# Patient Record
Sex: MALE | Race: WHITE | NOT HISPANIC OR LATINO | Employment: OTHER | URBAN - METROPOLITAN AREA
[De-identification: names, ages, dates, MRNs, and addresses within clinical notes are randomized per-mention and may not be internally consistent; named-entity substitution may affect disease eponyms.]

---

## 2018-01-22 ENCOUNTER — OFFICE VISIT CONVERTED (OUTPATIENT)
Dept: UROLOGY | Facility: CLINIC | Age: 63
End: 2018-01-22
Attending: UROLOGY

## 2018-02-19 ENCOUNTER — OFFICE VISIT CONVERTED (OUTPATIENT)
Dept: UROLOGY | Facility: CLINIC | Age: 63
End: 2018-02-19
Attending: UROLOGY

## 2018-03-23 ENCOUNTER — OFFICE VISIT CONVERTED (OUTPATIENT)
Dept: UROLOGY | Facility: CLINIC | Age: 63
End: 2018-03-23
Attending: UROLOGY

## 2018-04-27 ENCOUNTER — OFFICE VISIT CONVERTED (OUTPATIENT)
Dept: UROLOGY | Facility: CLINIC | Age: 63
End: 2018-04-27
Attending: UROLOGY

## 2018-06-01 ENCOUNTER — OFFICE VISIT CONVERTED (OUTPATIENT)
Dept: UROLOGY | Facility: CLINIC | Age: 63
End: 2018-06-01
Attending: UROLOGY

## 2018-10-22 ENCOUNTER — OFFICE VISIT CONVERTED (OUTPATIENT)
Dept: UROLOGY | Facility: CLINIC | Age: 63
End: 2018-10-22
Attending: UROLOGY

## 2018-10-22 ENCOUNTER — CONVERSION ENCOUNTER (OUTPATIENT)
Dept: SURGERY | Facility: CLINIC | Age: 63
End: 2018-10-22

## 2019-05-13 ENCOUNTER — OFFICE VISIT CONVERTED (OUTPATIENT)
Dept: UROLOGY | Facility: CLINIC | Age: 64
End: 2019-05-13
Attending: UROLOGY

## 2019-12-02 ENCOUNTER — OFFICE VISIT CONVERTED (OUTPATIENT)
Dept: UROLOGY | Facility: CLINIC | Age: 64
End: 2019-12-02
Attending: UROLOGY

## 2021-01-20 ENCOUNTER — OFFICE VISIT CONVERTED (OUTPATIENT)
Dept: UROLOGY | Facility: CLINIC | Age: 66
End: 2021-01-20
Attending: NURSE PRACTITIONER

## 2021-04-26 ENCOUNTER — CONVERSION ENCOUNTER (OUTPATIENT)
Dept: SURGERY | Facility: CLINIC | Age: 66
End: 2021-04-26

## 2021-04-26 ENCOUNTER — OFFICE VISIT CONVERTED (OUTPATIENT)
Dept: UROLOGY | Facility: CLINIC | Age: 66
End: 2021-04-26
Attending: NURSE PRACTITIONER

## 2021-04-26 LAB
BILIRUB UR QL STRIP: NEGATIVE
COLOR UR: YELLOW
CONV BACTERIA IN URINE MICRO: 0
CONV CALCIUM OXALATE CRYSTALS /HPF IN URINE SEDIMENT BY MICROSCOPY: 0
CONV CLARITY OF URINE: CLEAR
CONV PROTEIN IN URINE BY AUTOMATED TEST STRIP: NEGATIVE
CONV UROBILINOGEN IN URINE BY AUTOMATED TEST STRIP: NORMAL
GLUCOSE UR QL: NEGATIVE
HGB UR QL STRIP: NEGATIVE
KETONES UR QL STRIP: NEGATIVE
LEUKOCYTE ESTERASE UR QL STRIP: NEGATIVE
NITRITE UR QL STRIP: NEGATIVE
PH UR STRIP.AUTO: 6 [PH]
RBC #/AREA URNS HPF: 0 /[HPF]
RENAL EPI CELLS #/AREA URNS HPF: 0 /[HPF]
SP GR UR: 1.02
SQUAMOUS SPT QL MICRO: 0
WBC #/AREA URNS HPF: 0 /[HPF]

## 2021-05-14 VITALS — HEIGHT: 71 IN | BODY MASS INDEX: 28.56 KG/M2 | RESPIRATION RATE: 16 BRPM | WEIGHT: 204 LBS

## 2021-05-14 VITALS — HEIGHT: 71 IN | BODY MASS INDEX: 27.61 KG/M2 | WEIGHT: 197.25 LBS | RESPIRATION RATE: 12 BRPM

## 2021-05-15 VITALS — BODY MASS INDEX: 28.16 KG/M2 | WEIGHT: 201.12 LBS | RESPIRATION RATE: 16 BRPM | HEIGHT: 71 IN

## 2021-05-15 VITALS — BODY MASS INDEX: 28.03 KG/M2 | HEIGHT: 71 IN | RESPIRATION RATE: 16 BRPM | WEIGHT: 200.25 LBS

## 2021-05-16 VITALS — RESPIRATION RATE: 14 BRPM | WEIGHT: 210 LBS | HEIGHT: 71 IN | BODY MASS INDEX: 29.4 KG/M2

## 2021-05-16 VITALS — RESPIRATION RATE: 12 BRPM | BODY MASS INDEX: 28.42 KG/M2 | WEIGHT: 203 LBS | HEIGHT: 71 IN

## 2021-05-16 VITALS — BODY MASS INDEX: 29.4 KG/M2 | HEIGHT: 71 IN | WEIGHT: 210 LBS | RESPIRATION RATE: 15 BRPM

## 2021-05-16 VITALS — HEIGHT: 71 IN | BODY MASS INDEX: 29.12 KG/M2 | WEIGHT: 208 LBS | RESPIRATION RATE: 16 BRPM

## 2021-05-16 VITALS — WEIGHT: 195 LBS | BODY MASS INDEX: 27.3 KG/M2 | HEIGHT: 71 IN | RESPIRATION RATE: 14 BRPM

## 2021-05-16 VITALS — RESPIRATION RATE: 14 BRPM | HEIGHT: 71 IN | WEIGHT: 203 LBS | BODY MASS INDEX: 28.42 KG/M2

## 2021-05-21 ENCOUNTER — OFFICE VISIT CONVERTED (OUTPATIENT)
Dept: UROLOGY | Facility: CLINIC | Age: 66
End: 2021-05-21
Attending: UROLOGY

## 2021-10-30 PROBLEM — N40.1 BENIGN PROSTATIC HYPERPLASIA WITH INCOMPLETE BLADDER EMPTYING: Status: ACTIVE | Noted: 2021-10-30

## 2021-10-30 PROBLEM — R39.14 BENIGN PROSTATIC HYPERPLASIA WITH INCOMPLETE BLADDER EMPTYING: Status: ACTIVE | Noted: 2021-10-30

## 2022-02-10 DIAGNOSIS — N40.0 BENIGN PROSTATIC HYPERPLASIA, UNSPECIFIED WHETHER LOWER URINARY TRACT SYMPTOMS PRESENT: Primary | ICD-10-CM

## 2022-02-10 RX ORDER — TAMSULOSIN HYDROCHLORIDE 0.4 MG/1
1 CAPSULE ORAL DAILY
COMMUNITY
End: 2022-02-10 | Stop reason: SDUPTHER

## 2022-02-16 RX ORDER — TAMSULOSIN HYDROCHLORIDE 0.4 MG/1
1 CAPSULE ORAL DAILY
Qty: 30 CAPSULE | Refills: 2 | Status: SHIPPED | OUTPATIENT
Start: 2022-02-16 | End: 2022-03-29 | Stop reason: SDUPTHER

## 2022-03-29 ENCOUNTER — OFFICE VISIT (OUTPATIENT)
Dept: UROLOGY | Facility: CLINIC | Age: 67
End: 2022-03-29

## 2022-03-29 VITALS — HEIGHT: 71 IN | BODY MASS INDEX: 27.86 KG/M2 | WEIGHT: 199 LBS | RESPIRATION RATE: 16 BRPM

## 2022-03-29 DIAGNOSIS — R39.14 BENIGN PROSTATIC HYPERPLASIA WITH INCOMPLETE BLADDER EMPTYING: Primary | ICD-10-CM

## 2022-03-29 DIAGNOSIS — N40.0 BENIGN PROSTATIC HYPERPLASIA, UNSPECIFIED WHETHER LOWER URINARY TRACT SYMPTOMS PRESENT: ICD-10-CM

## 2022-03-29 DIAGNOSIS — N40.1 BENIGN PROSTATIC HYPERPLASIA WITH INCOMPLETE BLADDER EMPTYING: Primary | ICD-10-CM

## 2022-03-29 LAB — URINE VOLUME: 411

## 2022-03-29 PROCEDURE — 99213 OFFICE O/P EST LOW 20 MIN: CPT | Performed by: UROLOGY

## 2022-03-29 RX ORDER — TAMSULOSIN HYDROCHLORIDE 0.4 MG/1
1 CAPSULE ORAL DAILY
Qty: 90 CAPSULE | Refills: 4 | Status: SHIPPED | OUTPATIENT
Start: 2022-03-29 | End: 2023-04-03 | Stop reason: SDUPTHER

## 2022-03-29 RX ORDER — ATORVASTATIN CALCIUM 10 MG/1
10 TABLET, FILM COATED ORAL DAILY
COMMUNITY
Start: 2022-03-08

## 2022-03-29 RX ORDER — AMLODIPINE BESYLATE 10 MG/1
10 TABLET ORAL DAILY
COMMUNITY
Start: 2022-03-08

## 2022-03-30 ENCOUNTER — TELEPHONE (OUTPATIENT)
Dept: UROLOGY | Facility: CLINIC | Age: 67
End: 2022-03-30

## 2023-03-31 PROBLEM — N40.1 BENIGN PROSTATIC HYPERPLASIA WITH LOWER URINARY TRACT SYMPTOMS: Status: ACTIVE | Noted: 2023-03-31

## 2023-04-03 ENCOUNTER — OFFICE VISIT (OUTPATIENT)
Dept: UROLOGY | Facility: CLINIC | Age: 68
End: 2023-04-03
Payer: MEDICARE

## 2023-04-03 VITALS — WEIGHT: 192 LBS | HEIGHT: 71 IN | BODY MASS INDEX: 26.88 KG/M2 | RESPIRATION RATE: 14 BRPM

## 2023-04-03 DIAGNOSIS — N40.1 BENIGN PROSTATIC HYPERPLASIA WITH LOWER URINARY TRACT SYMPTOMS, SYMPTOM DETAILS UNSPECIFIED: Primary | ICD-10-CM

## 2023-04-03 DIAGNOSIS — N40.0 BENIGN PROSTATIC HYPERPLASIA, UNSPECIFIED WHETHER LOWER URINARY TRACT SYMPTOMS PRESENT: ICD-10-CM

## 2023-04-03 LAB
BILIRUB BLD-MCNC: NEGATIVE MG/DL
CLARITY, POC: CLEAR
COLOR UR: YELLOW
EXPIRATION DATE: NORMAL
GLUCOSE UR STRIP-MCNC: NEGATIVE MG/DL
KETONES UR QL: NEGATIVE
LEUKOCYTE EST, POC: NEGATIVE
Lab: NORMAL
NITRITE UR-MCNC: NEGATIVE MG/ML
PH UR: 6 [PH] (ref 5–8)
PROT UR STRIP-MCNC: NEGATIVE MG/DL
RBC # UR STRIP: NEGATIVE /UL
SP GR UR: 1.01 (ref 1–1.03)
SPECIMEN VOL 24H UR: 525 L
UROBILINOGEN UR QL: NORMAL

## 2023-04-03 PROCEDURE — 99214 OFFICE O/P EST MOD 30 MIN: CPT | Performed by: UROLOGY

## 2023-04-03 PROCEDURE — 1160F RVW MEDS BY RX/DR IN RCRD: CPT | Performed by: UROLOGY

## 2023-04-03 PROCEDURE — 51798 US URINE CAPACITY MEASURE: CPT | Performed by: UROLOGY

## 2023-04-03 PROCEDURE — 1159F MED LIST DOCD IN RCRD: CPT | Performed by: UROLOGY

## 2023-04-03 PROCEDURE — 81003 URINALYSIS AUTO W/O SCOPE: CPT | Performed by: UROLOGY

## 2023-04-03 RX ORDER — TAMSULOSIN HYDROCHLORIDE 0.4 MG/1
1 CAPSULE ORAL DAILY
Qty: 90 CAPSULE | Refills: 4 | Status: SHIPPED | OUTPATIENT
Start: 2023-04-03

## 2023-04-07 ENCOUNTER — TELEPHONE (OUTPATIENT)
Dept: UROLOGY | Facility: CLINIC | Age: 68
End: 2023-04-07
Payer: MEDICARE

## 2023-04-20 ENCOUNTER — TELEPHONE (OUTPATIENT)
Dept: UROLOGY | Facility: CLINIC | Age: 68
End: 2023-04-20
Payer: MEDICARE

## 2023-09-26 ENCOUNTER — PROCEDURE VISIT (OUTPATIENT)
Dept: UROLOGY | Facility: CLINIC | Age: 68
End: 2023-09-26
Payer: MEDICARE

## 2023-09-26 VITALS — DIASTOLIC BLOOD PRESSURE: 92 MMHG | SYSTOLIC BLOOD PRESSURE: 153 MMHG | HEART RATE: 69 BPM

## 2023-09-26 DIAGNOSIS — N40.1 BENIGN PROSTATIC HYPERPLASIA WITH LOWER URINARY TRACT SYMPTOMS, SYMPTOM DETAILS UNSPECIFIED: Primary | ICD-10-CM

## 2023-09-26 RX ORDER — LOSARTAN POTASSIUM 25 MG/1
25 TABLET ORAL DAILY
COMMUNITY
Start: 2023-08-17

## 2023-09-26 RX ORDER — CLOPIDOGREL BISULFATE 75 MG
75 TABLET ORAL DAILY
COMMUNITY
Start: 2023-08-17

## 2023-09-26 RX ORDER — ASPIRIN 81 MG/1
81 TABLET, CHEWABLE ORAL
COMMUNITY
Start: 2023-08-17

## 2023-10-02 ENCOUNTER — TELEPHONE (OUTPATIENT)
Dept: UROLOGY | Facility: CLINIC | Age: 68
End: 2023-10-02
Payer: MEDICARE

## 2023-10-19 ENCOUNTER — TELEPHONE (OUTPATIENT)
Dept: UROLOGY | Facility: CLINIC | Age: 68
End: 2023-10-19
Payer: MEDICARE

## 2023-11-03 ENCOUNTER — OFFICE VISIT (OUTPATIENT)
Dept: UROLOGY | Facility: CLINIC | Age: 68
End: 2023-11-03
Payer: MEDICARE

## 2023-11-03 DIAGNOSIS — R33.8 BENIGN PROSTATIC HYPERPLASIA WITH URINARY RETENTION: ICD-10-CM

## 2023-11-03 DIAGNOSIS — N40.1 BENIGN PROSTATIC HYPERPLASIA WITH POST-VOID DRIBBLING: Primary | ICD-10-CM

## 2023-11-03 DIAGNOSIS — N39.43 BENIGN PROSTATIC HYPERPLASIA WITH POST-VOID DRIBBLING: Primary | ICD-10-CM

## 2023-11-03 DIAGNOSIS — N40.1 BENIGN PROSTATIC HYPERPLASIA WITH URINARY RETENTION: ICD-10-CM

## 2024-02-23 ENCOUNTER — OFFICE VISIT (OUTPATIENT)
Dept: UROLOGY | Facility: CLINIC | Age: 69
End: 2024-02-23
Payer: MEDICARE

## 2024-02-23 VITALS — WEIGHT: 207 LBS | RESPIRATION RATE: 16 BRPM | BODY MASS INDEX: 28.98 KG/M2 | HEIGHT: 71 IN

## 2024-02-23 DIAGNOSIS — N40.1 BENIGN PROSTATIC HYPERPLASIA WITH URINARY RETENTION: Primary | ICD-10-CM

## 2024-02-23 DIAGNOSIS — R33.8 BENIGN PROSTATIC HYPERPLASIA WITH URINARY RETENTION: Primary | ICD-10-CM

## 2024-03-15 ENCOUNTER — TELEPHONE (OUTPATIENT)
Dept: UROLOGY | Facility: CLINIC | Age: 69
End: 2024-03-15

## 2024-06-18 DIAGNOSIS — N40.0 BENIGN PROSTATIC HYPERPLASIA, UNSPECIFIED WHETHER LOWER URINARY TRACT SYMPTOMS PRESENT: ICD-10-CM

## 2024-06-20 RX ORDER — TAMSULOSIN HYDROCHLORIDE 0.4 MG/1
1 CAPSULE ORAL DAILY
Qty: 90 CAPSULE | Refills: 4 | Status: SHIPPED | OUTPATIENT
Start: 2024-06-20

## 2024-07-11 ENCOUNTER — PROCEDURE VISIT (OUTPATIENT)
Dept: UROLOGY | Facility: CLINIC | Age: 69
End: 2024-07-11
Payer: MEDICARE

## 2024-07-11 DIAGNOSIS — N40.1 BENIGN PROSTATIC HYPERPLASIA WITH INCOMPLETE BLADDER EMPTYING: Primary | ICD-10-CM

## 2024-07-11 DIAGNOSIS — R39.14 BENIGN PROSTATIC HYPERPLASIA WITH INCOMPLETE BLADDER EMPTYING: Primary | ICD-10-CM

## 2024-08-30 ENCOUNTER — OFFICE VISIT (OUTPATIENT)
Dept: UROLOGY | Facility: CLINIC | Age: 69
End: 2024-08-30
Payer: MEDICARE

## 2024-08-30 VITALS — BODY MASS INDEX: 28.7 KG/M2 | RESPIRATION RATE: 16 BRPM | HEIGHT: 71 IN | WEIGHT: 205 LBS

## 2024-08-30 DIAGNOSIS — N52.01 ERECTILE DYSFUNCTION DUE TO ARTERIAL INSUFFICIENCY: ICD-10-CM

## 2024-08-30 DIAGNOSIS — N40.1 BENIGN PROSTATIC HYPERPLASIA WITH URINARY RETENTION: Primary | ICD-10-CM

## 2024-08-30 DIAGNOSIS — R33.8 BENIGN PROSTATIC HYPERPLASIA WITH URINARY RETENTION: Primary | ICD-10-CM

## 2024-08-30 RX ORDER — TADALAFIL 20 MG/1
20 TABLET ORAL DAILY PRN
Qty: 3 TABLET | Refills: 5 | Status: SHIPPED | OUTPATIENT
Start: 2024-08-30

## 2024-08-30 RX ORDER — TAMSULOSIN HYDROCHLORIDE 0.4 MG/1
1 CAPSULE ORAL DAILY
Qty: 90 CAPSULE | Refills: 4 | Status: SHIPPED | OUTPATIENT
Start: 2024-08-30

## 2025-04-14 ENCOUNTER — TELEPHONE (OUTPATIENT)
Dept: UROLOGY | Age: 70
End: 2025-04-14
Payer: MEDICARE

## 2025-04-14 NOTE — TELEPHONE ENCOUNTER
PATIENT CALLED AND SAID HE NOTICED HE HAD BLOOD IN URINE ON SATURDAY NIGHT.  NO BURNING OR PAIN WITH URINATION.    HE ASKED HE CAN COME TO GET IT CHECKED.     #[phone]

## 2025-04-14 NOTE — TELEPHONE ENCOUNTER
Spoke to patient. Patient only had bleeding when urinating. No pain or burning. I instructed patient to ensure he drinks plenty of water. Patient verbalized understanding of information.

## 2025-04-14 NOTE — TELEPHONE ENCOUNTER
I CALLED THE PATIENT.  HE IS DOING CIC.  HE HAS NO PAIN OR FEVER.  HE SAID THAT THERE IS MORE THAN A LITTLE BLOOD.  HE SAID THERE HAS BEEN A STREAM OF BLOOD SINCE SATURDAY.  HE MAY HAVE SOME INCREASED FREQUENCY.    PLEASE CALL PATIENT BACK [phone]

## 2025-04-26 ENCOUNTER — HOSPITAL ENCOUNTER (EMERGENCY)
Facility: HOSPITAL | Age: 70
Discharge: HOME | End: 2025-04-27
Attending: EMERGENCY MEDICINE
Payer: MEDICARE

## 2025-04-26 ENCOUNTER — APPOINTMENT (OUTPATIENT)
Dept: ULTRASOUND IMAGING | Facility: HOSPITAL | Age: 70
End: 2025-04-26
Payer: MEDICARE

## 2025-04-26 VITALS
HEART RATE: 63 BPM | OXYGEN SATURATION: 95 % | BODY MASS INDEX: 28 KG/M2 | DIASTOLIC BLOOD PRESSURE: 51 MMHG | HEIGHT: 71 IN | WEIGHT: 200 LBS | TEMPERATURE: 100.7 F | SYSTOLIC BLOOD PRESSURE: 123 MMHG | RESPIRATION RATE: 18 BRPM

## 2025-04-26 DIAGNOSIS — N30.90 CYSTITIS: ICD-10-CM

## 2025-04-26 DIAGNOSIS — N45.1 EPIDIDYMITIS: Primary | ICD-10-CM

## 2025-04-26 LAB
ALBUMIN SERPL-MCNC: 3.8 G/DL (ref 3.5–5.2)
ALBUMIN/GLOB SERPL: 1.5 G/DL
ALP SERPL-CCNC: 69 U/L (ref 39–117)
ALT SERPL W P-5'-P-CCNC: 17 U/L (ref 1–41)
ANION GAP SERPL CALCULATED.3IONS-SCNC: 8 MMOL/L (ref 5–15)
AST SERPL-CCNC: 18 U/L (ref 1–40)
BACTERIA UR QL AUTO: ABNORMAL /HPF
BASOPHILS # BLD AUTO: 0.03 10*3/MM3 (ref 0–0.2)
BASOPHILS NFR BLD AUTO: 0.2 % (ref 0–1.5)
BILIRUB SERPL-MCNC: 0.9 MG/DL (ref 0–1.2)
BILIRUB UR QL STRIP: NEGATIVE
BUN SERPL-MCNC: 24 MG/DL (ref 8–23)
BUN/CREAT SERPL: 18.8 (ref 7–25)
CALCIUM SPEC-SCNC: 8.7 MG/DL (ref 8.6–10.5)
CHLORIDE SERPL-SCNC: 105 MMOL/L (ref 98–107)
CLARITY UR: ABNORMAL
CO2 SERPL-SCNC: 21 MMOL/L (ref 22–29)
COLOR UR: YELLOW
CREAT SERPL-MCNC: 1.28 MG/DL (ref 0.76–1.27)
D-LACTATE SERPL-SCNC: 0.6 MMOL/L (ref 0.5–2)
DEPRECATED RDW RBC AUTO: 41.7 FL (ref 37–54)
EGFRCR SERPLBLD CKD-EPI 2021: 59.8 ML/MIN/1.73
EOSINOPHIL # BLD AUTO: 0.03 10*3/MM3 (ref 0–0.4)
EOSINOPHIL NFR BLD AUTO: 0.2 % (ref 0.3–6.2)
ERYTHROCYTE [DISTWIDTH] IN BLOOD BY AUTOMATED COUNT: 13.4 % (ref 12.3–15.4)
GLOBULIN UR ELPH-MCNC: 2.6 GM/DL
GLUCOSE SERPL-MCNC: 115 MG/DL (ref 65–99)
GLUCOSE UR STRIP-MCNC: NEGATIVE MG/DL
HCT VFR BLD AUTO: 37.1 % (ref 37.5–51)
HGB BLD-MCNC: 12.6 G/DL (ref 13–17.7)
HGB UR QL STRIP.AUTO: ABNORMAL
HYALINE CASTS UR QL AUTO: ABNORMAL /LPF
IMM GRANULOCYTES # BLD AUTO: 0.06 10*3/MM3 (ref 0–0.05)
IMM GRANULOCYTES NFR BLD AUTO: 0.4 % (ref 0–0.5)
KETONES UR QL STRIP: NEGATIVE
LEUKOCYTE ESTERASE UR QL STRIP.AUTO: ABNORMAL
LYMPHOCYTES # BLD AUTO: 0.77 10*3/MM3 (ref 0.7–3.1)
LYMPHOCYTES NFR BLD AUTO: 5.3 % (ref 19.6–45.3)
MCH RBC QN AUTO: 28.6 PG (ref 26.6–33)
MCHC RBC AUTO-ENTMCNC: 34 G/DL (ref 31.5–35.7)
MCV RBC AUTO: 84.3 FL (ref 79–97)
MONOCYTES # BLD AUTO: 0.96 10*3/MM3 (ref 0.1–0.9)
MONOCYTES NFR BLD AUTO: 6.6 % (ref 5–12)
NEUTROPHILS NFR BLD AUTO: 12.71 10*3/MM3 (ref 1.7–7)
NEUTROPHILS NFR BLD AUTO: 87.3 % (ref 42.7–76)
NITRITE UR QL STRIP: POSITIVE
NRBC BLD AUTO-RTO: 0 /100 WBC (ref 0–0.2)
PH UR STRIP.AUTO: 5.5 [PH] (ref 5–8)
PLATELET # BLD AUTO: 127 10*3/MM3 (ref 140–450)
PMV BLD AUTO: 8 FL (ref 6–12)
POTASSIUM SERPL-SCNC: 4.1 MMOL/L (ref 3.5–5.2)
PROT SERPL-MCNC: 6.4 G/DL (ref 6–8.5)
PROT UR QL STRIP: NEGATIVE
RBC # BLD AUTO: 4.4 10*6/MM3 (ref 4.14–5.8)
RBC # UR STRIP: ABNORMAL /HPF
REF LAB TEST METHOD: ABNORMAL
SODIUM SERPL-SCNC: 134 MMOL/L (ref 136–145)
SP GR UR STRIP: 1.02 (ref 1–1.03)
SQUAMOUS #/AREA URNS HPF: ABNORMAL /HPF
UROBILINOGEN UR QL STRIP: ABNORMAL
WBC # UR STRIP: ABNORMAL /HPF
WBC NRBC COR # BLD AUTO: 14.56 10*3/MM3 (ref 3.4–10.8)

## 2025-04-26 PROCEDURE — 81001 URINALYSIS AUTO W/SCOPE: CPT | Performed by: PHYSICIAN ASSISTANT

## 2025-04-26 PROCEDURE — 96374 THER/PROPH/DIAG INJ IV PUSH: CPT

## 2025-04-26 PROCEDURE — 93976 VASCULAR STUDY: CPT

## 2025-04-26 PROCEDURE — 87040 BLOOD CULTURE FOR BACTERIA: CPT | Performed by: PHYSICIAN ASSISTANT

## 2025-04-26 PROCEDURE — 83605 ASSAY OF LACTIC ACID: CPT | Performed by: PHYSICIAN ASSISTANT

## 2025-04-26 PROCEDURE — 25010000002 CEFTRIAXONE PER 250 MG: Performed by: EMERGENCY MEDICINE

## 2025-04-26 PROCEDURE — 76870 US EXAM SCROTUM: CPT

## 2025-04-26 PROCEDURE — 36415 COLL VENOUS BLD VENIPUNCTURE: CPT

## 2025-04-26 PROCEDURE — 80053 COMPREHEN METABOLIC PANEL: CPT | Performed by: PHYSICIAN ASSISTANT

## 2025-04-26 PROCEDURE — 85025 COMPLETE CBC W/AUTO DIFF WBC: CPT | Performed by: PHYSICIAN ASSISTANT

## 2025-04-26 PROCEDURE — 99284 EMERGENCY DEPT VISIT MOD MDM: CPT

## 2025-04-26 RX ORDER — CEPHALEXIN 500 MG/1
500 CAPSULE ORAL 3 TIMES DAILY
Qty: 21 CAPSULE | Refills: 0 | Status: SHIPPED | OUTPATIENT
Start: 2025-04-26

## 2025-04-26 RX ADMIN — CEFTRIAXONE SODIUM 1000 MG: 1 INJECTION, POWDER, FOR SOLUTION INTRAMUSCULAR; INTRAVENOUS at 23:29

## 2025-04-27 NOTE — DISCHARGE INSTRUCTIONS
ED testing showed that you have a infection of the epididymitis which should resolve with antibiotics. You also have bacteria in your urine and a culture is pending.  If symptoms are not better in the next 3 to 5 days follow-up with your urologist.  Do not hesitate to return for worsening symptoms or as needed.

## 2025-04-27 NOTE — ED PROVIDER NOTES
SHARED VISIT: This visit was performed by BOTH a physician and an APC. The substantive portion of the medical decision making was performed by this attesting physician who made or approved the management plan and takes responsibility for patient management. All studies in the APC note (if performed) were independently interpreted by me.      The MAIK and I have discussed this patient's history, physical exam, and treatment plan.  I have reviewed the documentation and personally had a face to face interaction with the patient. I affirm the documentation and agree with the treatment and plan.  The attached note describes my personal findings.       I provided a substantive portion of the care of the patient.  I personally performed the physical exam in its entirety, and below are my findings.        History  69-year-old male with history of BPH presents with testicular pain and swelling.  Symptoms began earlier today.  Patient does have a history of BPH and does do self caths at home.  He is followed by urologist in [place].    Physical Exam  Vital Signs reviewed  GENERAL: Alert and well-appearing male in no obvious distress.  Looks younger than stated age.  Temperature 100.7.  HENT: nares patent  EYES: no scleral icterus  CV: regular rhythm, regular rate  RESPIRATORY: normal effort, clear to auscultation bilaterally  ABDOMEN: soft, nontender to palpation  MUSCULOSKELETAL: no deformity  NEURO: Strength sensation and coordination are grossly intact.  Speech and mentation are unremarkable  SKIN: warm, dry      Assessment and Data Review    ED Course as of 04/27/25 2108   Sat Apr 26, 2025 2143 Lactate: 0.6 [KA]   2143 WBC(!): 14.56 [KA]   2143 Hemoglobin(!): 12.6 [KA]   2248 Glucose(!): 115 [KA]   2306 Bacteria, UA(!): 2+ [KA]   2307 WBC, UA(!): Too Numerous to Count [KA]   2307 Nitrite, UA(!): Positive [KA]      ED Course User Index  [KA] Joana iRder PA-C       I did discuss treatment and evaluation of this  patient with ANTONIA Rider.    I did independently interpret and evaluate ED testing which is notable for the following:    CBC notable for elevated white count 12.6 worrisome for possible underlying infection.  Chemistries show mildly elevated BUN/creatinine of 24 and 1.28.  Lactic acid normal would go against underlying sepsis.  Testicular ultrasound shows hyperemia of the epididymitis consistent with likely epididymitis.    Patient is febrile and does have mild leukocytosis but is quite well-appearing and think you will do okay on an outpatient basis.  Will treat with IV Rocephin, culture urine and sent home on oral antibiotics.  He [place] a urologist in a town that he can follow-up with if not improving over the next 3 to 5 days.     Paco Savage MD  04/27/25 8682

## 2025-04-27 NOTE — ED PROVIDER NOTES
EMERGENCY DEPARTMENT ENCOUNTER  Room Number:  07/07  PCP: Carli Hoyt PA  Independent Historians: Patient      HPI:  Chief Complaint: had concerns including Testicle Pain.     A complete HPI/ROS/PMH/PSH/SH/FH are unobtainable due to: None    Chronic or social conditions impacting patient care (Social Determinants of Health): None      Context: The patient is a 69 y.o. male with a medical history of BPH, incomplete bladder emptying who presents to the ED c/o acute left testicular pain that started this morning and has gradually worsened.  He also notes swelling and redness.  Denies any trauma to the area.  Denies any hematuria dysuria urinary frequency.  Due to history of incomplete bladder emptying he does self cath about 3 times per day.  Urologist is in [facility].  He denies any abdominal pain fevers chills nausea vomiting or other complaints at this time.      Review of prior external notes (non-ED) -and- Review of prior external test results outside of this encounter:  Labs performed 10/9/2024 and total cholesterol was 129, HDL 50,         PAST MEDICAL HISTORY  Active Ambulatory Problems     Diagnosis Date Noted    Benign prostatic hyperplasia with incomplete bladder emptying 10/30/2021    Benign prostatic hyperplasia with lower urinary tract symptoms 03/31/2023    Erectile dysfunction due to arterial insufficiency 08/30/2024     Resolved Ambulatory Problems     Diagnosis Date Noted    No Resolved Ambulatory Problems     Past Medical History:   Diagnosis Date    Benign enlargement of prostate 11/15/2017    Bladder neck obstruction 11/15/2017    HLD (hyperlipidemia)     HTN (hypertension)     Incomplete bladder emptying 11/15/2017    Urinary retention 11/15/2017         PAST SURGICAL HISTORY  Past Surgical History:   Procedure Laterality Date    COLONOSCOPY      NOSE SURGERY      OTHER SURGICAL HISTORY      EAR Surgery  (Unspecified)         FAMILY HISTORY  No family history on  file.      SOCIAL HISTORY  Social History     Socioeconomic History    Marital status:    Tobacco Use    Smoking status: Former     Current packs/day: 0.00     Types: Cigarettes     Quit date:      Years since quittin.3    Smokeless tobacco: Never   Vaping Use    Vaping status: Never Used   Substance and Sexual Activity    Sexual activity: Defer         ALLERGIES  Patient has no known allergies.      REVIEW OF SYSTEMS  Review of Systems  Included in HPI  All systems reviewed and negative except for those discussed in HPI.      PHYSICAL EXAM    I have reviewed the triage vital signs and nursing notes.    ED Triage Vitals [25]   Temp Heart Rate Resp BP SpO2   (!) 100.7 °F (38.2 °C) 65 18 131/54 95 %      Temp src Heart Rate Source Patient Position BP Location FiO2 (%)   Oral Monitor Sitting Left arm --       Physical Exam  GENERAL: alert, no acute distress  SKIN: Warm, dry  HENT: Normocephalic, atraumatic  EYES: no scleral icterus  CV: regular rhythm, regular rate  RESPIRATORY: normal effort, lungs clear  ABDOMEN: nondistended nontender bowel sounds present no guarding or rigidity, soft  Exam chaperoned by YEMI Washington.  Patient circumcised, no genital lesions or penile discharge, there is edema and tenderness to the left testicle perineum is unremarkable in experience with no erythema edema or tenderness.  MUSCULOSKELETAL: no deformity  NEURO: alert, moves all extremities, follows commands            LAB RESULTS  Recent Results (from the past 24 hours)   Comprehensive Metabolic Panel    Collection Time: 25  9:14 PM    Specimen: Arm, Right; Blood   Result Value Ref Range    Glucose 115 (H) 65 - 99 mg/dL    BUN 24 (H) 8 - 23 mg/dL    Creatinine 1.28 (H) 0.76 - 1.27 mg/dL    Sodium 134 (L) 136 - 145 mmol/L    Potassium 4.1 3.5 - 5.2 mmol/L    Chloride 105 98 - 107 mmol/L    CO2 21.0 (L) 22.0 - 29.0 mmol/L    Calcium 8.7 8.6 - 10.5 mg/dL    Total Protein 6.4 6.0 - 8.5 g/dL    Albumin 3.8 3.5 -  5.2 g/dL    ALT (SGPT) 17 1 - 41 U/L    AST (SGOT) 18 1 - 40 U/L    Alkaline Phosphatase 69 39 - 117 U/L    Total Bilirubin 0.9 0.0 - 1.2 mg/dL    Globulin 2.6 gm/dL    A/G Ratio 1.5 g/dL    BUN/Creatinine Ratio 18.8 7.0 - 25.0    Anion Gap 8.0 5.0 - 15.0 mmol/L    eGFR 59.8 (L) >60.0 mL/min/1.73   Lactic Acid, Plasma    Collection Time: 04/26/25  9:14 PM    Specimen: Arm, Right; Blood   Result Value Ref Range    Lactate 0.6 0.5 - 2.0 mmol/L   CBC Auto Differential    Collection Time: 04/26/25  9:14 PM    Specimen: Arm, Right; Blood   Result Value Ref Range    WBC 14.56 (H) 3.40 - 10.80 10*3/mm3    RBC 4.40 4.14 - 5.80 10*6/mm3    Hemoglobin 12.6 (L) 13.0 - 17.7 g/dL    Hematocrit 37.1 (L) 37.5 - 51.0 %    MCV 84.3 79.0 - 97.0 fL    MCH 28.6 26.6 - 33.0 pg    MCHC 34.0 31.5 - 35.7 g/dL    RDW 13.4 12.3 - 15.4 %    RDW-SD 41.7 37.0 - 54.0 fl    MPV 8.0 6.0 - 12.0 fL    Platelets 127 (L) 140 - 450 10*3/mm3    Neutrophil % 87.3 (H) 42.7 - 76.0 %    Lymphocyte % 5.3 (L) 19.6 - 45.3 %    Monocyte % 6.6 5.0 - 12.0 %    Eosinophil % 0.2 (L) 0.3 - 6.2 %    Basophil % 0.2 0.0 - 1.5 %    Immature Grans % 0.4 0.0 - 0.5 %    Neutrophils, Absolute 12.71 (H) 1.70 - 7.00 10*3/mm3    Lymphocytes, Absolute 0.77 0.70 - 3.10 10*3/mm3    Monocytes, Absolute 0.96 (H) 0.10 - 0.90 10*3/mm3    Eosinophils, Absolute 0.03 0.00 - 0.40 10*3/mm3    Basophils, Absolute 0.03 0.00 - 0.20 10*3/mm3    Immature Grans, Absolute 0.06 (H) 0.00 - 0.05 10*3/mm3    nRBC 0.0 0.0 - 0.2 /100 WBC   Urinalysis With Microscopic If Indicated (No Culture) - Urine, Clean Catch    Collection Time: 04/26/25 10:49 PM    Specimen: Urine, Clean Catch   Result Value Ref Range    Color, UA Yellow Yellow, Straw    Appearance, UA Cloudy (A) Clear    pH, UA 5.5 5.0 - 8.0    Specific Gravity, UA 1.017 1.005 - 1.030    Glucose, UA Negative Negative    Ketones, UA Negative Negative    Bilirubin, UA Negative Negative    Blood, UA Moderate (2+) (A) Negative    Protein, UA  Negative Negative    Leuk Esterase, UA Moderate (2+) (A) Negative    Nitrite, UA Positive (A) Negative    Urobilinogen, UA 1.0 E.U./dL 0.2 - 1.0 E.U./dL   Urinalysis, Microscopic Only - Urine, Clean Catch    Collection Time: 04/26/25 10:49 PM    Specimen: Urine, Clean Catch   Result Value Ref Range    RBC, UA 0-2 None Seen, 0-2 /HPF    WBC, UA Too Numerous to Count (A) None Seen, 0-2 /HPF    Bacteria, UA 2+ (A) None Seen /HPF    Squamous Epithelial Cells, UA 0-2 None Seen, 0-2 /HPF    Hyaline Casts, UA 3-6 None Seen /LPF    Methodology Automated Microscopy          RADIOLOGY  US Scrotum & Testicles with doppler  Result Date: 4/26/2025  SCROTAL DOPPLER  HISTORY: Left scrotal pain and swelling  COMPARISON: None available.  TECHNIQUE: Grayscale, color Doppler, and spectral Doppler waveform analysis was performed through the scrotum.  FINDINGS: Right testicle measures 4.4 x 2.0 x 2.6 cm. Left testicle measures 4.0 x 1.8 x 3.2 cm. Normal color Doppler flow is noted within the right testicle. There is a small right hydrocele. Right epididymal head measures 1.0 x 0.6 cm. Left epididymal head measures 0.9 x 0.4 cm. The left epididymal tail is markedly thickened and heterogeneous, and is very hypervascular. Overall, I think the left testicle is also somewhat hyperemic. The appearance suggests epididymitis. I think there is also some skin thickening of the scrotum overlying the left testicle.        1. No evidence of testicular torsion. 2. The left epididymal tail is markedly enlarged and heterogeneous. It is hypervascular. Appearance is concerning for epididymitis. I think the left testicle also appears somewhat hyperemic when compared to the right, and there is some apparent skin thickening of the left hemiscrotum compared to the right. Concomitant orchitis may also be present.  This report was finalized on 4/26/2025 10:49 PM by Dr. Mel Norton M.D on Workstation: [id number]          MEDICATIONS GIVEN IN  ER  Medications   cefTRIAXone (ROCEPHIN) 1,000 mg in sodium chloride 0.9 % 100 mL MBP (0 mg Intravenous Stopped 4/26/25 2666)         ORDERS PLACED DURING THIS VISIT:  Orders Placed This Encounter   Procedures    Blood Culture - Blood,    Blood Culture - Blood,    US Scrotum & Testicles with doppler    Comprehensive Metabolic Panel    Urinalysis With Microscopic If Indicated (No Culture) - Urine, Clean Catch    Lactic Acid, Plasma    CBC Auto Differential    Urinalysis, Microscopic Only - Urine, Clean Catch    Urinalysis With Culture If Indicated -    CBC & Differential         OUTPATIENT MEDICATION MANAGEMENT:  No current Epic-ordered facility-administered medications on file.     Current Outpatient Medications Ordered in Epic   Medication Sig Dispense Refill    amLODIPine (NORVASC) 10 MG tablet Take 1 tablet by mouth Daily.      aspirin 81 MG chewable tablet Chew 1 tablet.      atorvastatin (LIPITOR) 10 MG tablet Take 1 tablet by mouth Daily.      cephalexin (KEFLEX) 500 MG capsule Take 1 capsule by mouth 3 (Three) Times a Day. 21 capsule 0    losartan (COZAAR) 25 MG tablet Take 1 tablet by mouth Daily.      Plavix 75 MG tablet Take 1 tablet by mouth Daily.      tadalafil (CIALIS) 20 MG tablet Take 1 tablet by mouth Daily As Needed for Erectile Dysfunction. 3 tablet 5    tamsulosin (FLOMAX) 0.4 MG capsule 24 hr capsule TAKE 1 CAPSULE BY MOUTH ONCE DAILY 90 capsule 4    tamsulosin (FLOMAX) 0.4 MG capsule 24 hr capsule Take 1 capsule by mouth Daily. 90 capsule 4         PROCEDURES  Procedures            PROGRESS, DATA ANALYSIS, CONSULTS, AND MEDICAL DECISION MAKING  All labs have been independently interpreted by me.  All radiology studies have been reviewed by me. All EKG's have been independently viewed and interpreted by me.  Discussion below represents my analysis of pertinent findings related to patient's condition, differential diagnosis, treatment plan and final disposition.    DIFFERENTIAL    My differential  diagnosis includes but is not limited to epididymitis, orchitis, torsion    Clinical Scores:                  ED Course as of 04/26/25 2354   Sat Apr 26, 2025   2143 Lactate: 0.6 [KA]   2143 WBC(!): 14.56 [KA]   2143 Hemoglobin(!): 12.6 [KA]   2248 Glucose(!): 115 [KA]   2306 Bacteria, UA(!): 2+ [KA]   2307 WBC, UA(!): Too Numerous to Count [KA]   2307 Nitrite, UA(!): Positive [KA]      ED Course User Index  [KA] Joana Rider PA-C     I reassessed the patient, counseled him on all of his lab and imaging results.  Ultrasound consistent with acute left-sided epididymitis.  He does have some bacteria and leukocytes in his urine, acute infection versus chronic colonization.  He is given a dose of Rocephin in the emergency department, will discharge on a course of Keflex, urine culture ordered.  Patient feels much improved, motivated for discharge, encouraged him to return to the ER for any new or worsening symptoms or if he still has a fever in 48 hours or any concerns.        AS OF 23:54 EDT VITALS:    BP - 123/51  HR - 63  TEMP - (!) 100.7 °F (38.2 °C) (Oral)  O2 SATS - 95%    COMPLEXITY OF CARE  Admission was considered but after careful review of the patient's presentation, physical examination, diagnostic results, and response to treatment the patient may be safely discharged with outpatient follow-up.      DIAGNOSIS  Final diagnoses:   Epididymitis   Cystitis         DISPOSITION  ED Disposition       ED Disposition   Discharge    Condition   Stable    Comment   --                  FOLLOW UP  Carli Hoyt PA  [address]  [place] [id number] [phone]    In 1 week  If Not Better        Prescribed Medications     Medication List        New Prescriptions      cephalexin 500 MG capsule  Commonly known as: KEFLEX  Take 1 capsule by mouth 3 (Three) Times a Day.               Where to Get Your Medications        You can get these medications from any pharmacy    Bring a paper prescription for each  of these medications  cephalexin 500 MG capsule                   Please note that portions of this document were completed with a voice recognition program.    Note Disclaimer: At [facility], we believe that sharing information builds trust and better relationships. You are receiving this note because you recently visited [facility]. It is possible you will see health information before a provider has talked with you about it. This kind of information can be easy to misunderstand. To help you fully understand what it means for your health, we urge you to discuss this note with your provider.         Joana Rider PA-C  04/27/25 4232

## 2025-04-28 ENCOUNTER — TELEPHONE (OUTPATIENT)
Dept: UROLOGY | Age: 70
End: 2025-04-28
Payer: MEDICARE

## 2025-04-28 NOTE — TELEPHONE ENCOUNTER
PATIENT CALLED AND SAID HE WENT TO  ER ON 04/26/25.  THEY DID AN US AND TOLD HIM HE HAS AN INFECTION IN THE LEFT TESTICLE.  THEY GAVE HIM IV ANTIBIOTIC AND HE GOT A PRESCRIPTION FOR AN ANTIBIOTIC, WHICH HE GOT YESTERDAY.      THEY TOLD HIM TO MAKE DR. ZAVALA AWARE, AND TO F/U AT THE ER IF HE ISN'T BETTER IN 3 OR 4 DAYS.    PATIENT SAID THE SWELLING HAS GONE DOWN SOME AND HE FEELS A LOT BETTER TODAY.  NO FEVER YESTERDAY OR TODAY.    #[phone]

## 2025-04-29 NOTE — TELEPHONE ENCOUNTER
I CALLED THE PATIENT AND TOLD HIM,  PER ARGENIS:  As long as he is getting better does not have to follow-up until regular scheduled appointment

## 2025-04-29 NOTE — TELEPHONE ENCOUNTER
LMOM FOR PATIENT TO CALL ME.  PER ARGENIS:  As long as he is getting better does not have to follow-up until regular scheduled appointment     HUB MAY READ

## 2025-05-01 LAB
BACTERIA SPEC AEROBE CULT: NORMAL
BACTERIA SPEC AEROBE CULT: NORMAL